# Patient Record
Sex: FEMALE | Race: WHITE | NOT HISPANIC OR LATINO | URBAN - METROPOLITAN AREA
[De-identification: names, ages, dates, MRNs, and addresses within clinical notes are randomized per-mention and may not be internally consistent; named-entity substitution may affect disease eponyms.]

---

## 2019-09-18 ENCOUNTER — OFFICE VISIT - HEALTHEAST (OUTPATIENT)
Dept: FAMILY MEDICINE | Facility: CLINIC | Age: 39
End: 2019-09-18

## 2019-09-18 DIAGNOSIS — H92.01 RIGHT EAR PAIN: ICD-10-CM

## 2019-09-18 ASSESSMENT — MIFFLIN-ST. JEOR: SCORE: 1567.61

## 2021-05-25 ENCOUNTER — RECORDS - HEALTHEAST (OUTPATIENT)
Dept: ADMINISTRATIVE | Facility: CLINIC | Age: 41
End: 2021-05-25

## 2021-05-30 ENCOUNTER — RECORDS - HEALTHEAST (OUTPATIENT)
Dept: ADMINISTRATIVE | Facility: CLINIC | Age: 41
End: 2021-05-30

## 2021-06-01 NOTE — PROGRESS NOTES
Assessment / Impression     1. Right ear pain         Plan:     1.  Reviewed with patient her exam findings consistent with mild inflammation to the right external auditory canal but no evidence of secondary bacterial infection.  We discussed the likely diagnosis of psoriasis.  She is going to consider seeing dermatology for further evaluation and treatment.  No prescription treatment is required today.  We discussed the importance of avoiding anything in the external auditory canal to avoid the trauma.    Subjective:      HPI: Farheen Ogden is a 39 y.o. female with ear pain is here today for evaluation.  This is an otherwise healthy 39-year-old female who reports she is experiencing right ear pain for the past day.  She states on and off for the past couple of years she has started to experience some itching and scaling skin.  She states that starting in her ears and also in the corners of her eyes.  She also notes on the base of her scalp.  She believes this is likely psoriasis but she has not sought diagnosis and treatment recommendations with dermatology at this point.  She states yesterday she was using a Q-tip to relieve some of the pruritus in her right ear and states that she had some discomfort the rest of the day.  She states it hurts to touch the external part of her ear.  She also experienced pain with mastication.  She states she laid on a heating pad throughout the day and the symptoms improved.  Today her symptoms are much improved but they are still there and she wants to make sure she has not caused any permanent damage.  She denies any difficulty hearing and denies any ear discharge.  She reports she sometimes has a pruritus in the left ear but denies any symptoms of pain today.  She has no other questions or concerns.  No change in medical history. 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 Nonsmoker.       Review of Systems  Pertinent items are noted in HPI.       Objective:     /60 (Patient  "Site: Right Arm, Patient Position: Sitting, Cuff Size: Adult Regular)   Pulse 78   Temp 98.4  F (36.9  C) (Oral)   Resp 14   Ht 5' 8\" (1.727 m)   Wt 189 lb 6.4 oz (85.9 kg)   LMP 09/09/2019 (Exact Date)   Breastfeeding? No   BMI 28.80 kg/m    Physical Examination: General appearance - alert, well appearing, and in no distress  Eyes: pupils equal and reactive, extraocular eye movements intact  Mouth: mucous membranes moist, pharynx normal without lesions.  No pain with palpation of her TMJ joint.  Neck: supple, no significant adenopathy  Lymphatics: no palpable lymphadenopathy  Ears:   Right ear: reveals an external auditory canal that is intact with some mild erythema posteriorly and evidence of some petechiae anteriorly consistent with mild trauma.  The tympanic membrane is intact.  She has no pus or open wound in the canal is patent.  Left ear: Mild scaling to the skin at the opening of her external auditory canal.  She has no internal erythema or open lesions.  Her tympanic membrane is intact.    "

## 2021-06-03 VITALS
HEART RATE: 78 BPM | RESPIRATION RATE: 14 BRPM | SYSTOLIC BLOOD PRESSURE: 102 MMHG | WEIGHT: 189.4 LBS | BODY MASS INDEX: 28.7 KG/M2 | DIASTOLIC BLOOD PRESSURE: 60 MMHG | HEIGHT: 68 IN | TEMPERATURE: 98.4 F

## 2021-08-15 ENCOUNTER — HEALTH MAINTENANCE LETTER (OUTPATIENT)
Age: 41
End: 2021-08-15

## 2021-10-11 ENCOUNTER — HEALTH MAINTENANCE LETTER (OUTPATIENT)
Age: 41
End: 2021-10-11

## 2022-09-25 ENCOUNTER — HEALTH MAINTENANCE LETTER (OUTPATIENT)
Age: 42
End: 2022-09-25

## 2023-05-13 ENCOUNTER — HEALTH MAINTENANCE LETTER (OUTPATIENT)
Age: 43
End: 2023-05-13

## 2024-03-02 ENCOUNTER — HEALTH MAINTENANCE LETTER (OUTPATIENT)
Age: 44
End: 2024-03-02